# Patient Record
Sex: FEMALE | Race: WHITE | URBAN - METROPOLITAN AREA
[De-identification: names, ages, dates, MRNs, and addresses within clinical notes are randomized per-mention and may not be internally consistent; named-entity substitution may affect disease eponyms.]

---

## 2017-01-18 RX ORDER — ESCITALOPRAM OXALATE 20 MG/1
TABLET ORAL
Qty: 30 TABLET | Refills: 0 | Status: SHIPPED | OUTPATIENT
Start: 2017-01-18 | End: 2017-01-27 | Stop reason: SDUPTHER

## 2017-01-27 ENCOUNTER — OFFICE VISIT (OUTPATIENT)
Dept: FAMILY MEDICINE CLINIC | Age: 42
End: 2017-01-27

## 2017-01-27 VITALS
RESPIRATION RATE: 12 BRPM | HEART RATE: 68 BPM | DIASTOLIC BLOOD PRESSURE: 70 MMHG | BODY MASS INDEX: 35 KG/M2 | HEIGHT: 67 IN | SYSTOLIC BLOOD PRESSURE: 120 MMHG | WEIGHT: 223 LBS

## 2017-01-27 DIAGNOSIS — F33.1 MODERATE EPISODE OF RECURRENT MAJOR DEPRESSIVE DISORDER (HCC): ICD-10-CM

## 2017-01-27 DIAGNOSIS — F41.9 ANXIETY: Primary | ICD-10-CM

## 2017-01-27 PROCEDURE — G0444 DEPRESSION SCREEN ANNUAL: HCPCS | Performed by: FAMILY MEDICINE

## 2017-01-27 PROCEDURE — 99213 OFFICE O/P EST LOW 20 MIN: CPT | Performed by: FAMILY MEDICINE

## 2017-01-27 RX ORDER — ESCITALOPRAM OXALATE 20 MG/1
TABLET ORAL
Qty: 90 TABLET | Refills: 1 | Status: SHIPPED | OUTPATIENT
Start: 2017-01-27 | End: 2017-08-06 | Stop reason: SDUPTHER

## 2017-01-27 ASSESSMENT — PATIENT HEALTH QUESTIONNAIRE - PHQ9
7. TROUBLE CONCENTRATING ON THINGS, SUCH AS READING THE NEWSPAPER OR WATCHING TELEVISION: 2
10. IF YOU CHECKED OFF ANY PROBLEMS, HOW DIFFICULT HAVE THESE PROBLEMS MADE IT FOR YOU TO DO YOUR WORK, TAKE CARE OF THINGS AT HOME, OR GET ALONG WITH OTHER PEOPLE: 1
9. THOUGHTS THAT YOU WOULD BE BETTER OFF DEAD, OR OF HURTING YOURSELF: 0
SUM OF ALL RESPONSES TO PHQ QUESTIONS 1-9: 17
2. FEELING DOWN, DEPRESSED OR HOPELESS: 3
5. POOR APPETITE OR OVEREATING: 3
6. FEELING BAD ABOUT YOURSELF - OR THAT YOU ARE A FAILURE OR HAVE LET YOURSELF OR YOUR FAMILY DOWN: 3
8. MOVING OR SPEAKING SO SLOWLY THAT OTHER PEOPLE COULD HAVE NOTICED. OR THE OPPOSITE, BEING SO FIGETY OR RESTLESS THAT YOU HAVE BEEN MOVING AROUND A LOT MORE THAN USUAL: 1
3. TROUBLE FALLING OR STAYING ASLEEP: 3
SUM OF ALL RESPONSES TO PHQ9 QUESTIONS 1 & 2: 3
4. FEELING TIRED OR HAVING LITTLE ENERGY: 2

## 2017-02-15 ENCOUNTER — OFFICE VISIT (OUTPATIENT)
Dept: PSYCHOLOGY | Age: 42
End: 2017-02-15

## 2017-02-15 DIAGNOSIS — F32.89 OTHER DEPRESSION: Primary | ICD-10-CM

## 2017-02-15 PROCEDURE — 90791 PSYCH DIAGNOSTIC EVALUATION: CPT | Performed by: PSYCHOLOGIST

## 2017-02-27 RX ORDER — LEVOTHYROXINE SODIUM 112 UG/1
112 TABLET ORAL DAILY
Qty: 90 TABLET | Refills: 1 | Status: SHIPPED | OUTPATIENT
Start: 2017-02-27 | End: 2017-06-27 | Stop reason: SDUPTHER

## 2017-06-27 RX ORDER — LEVOTHYROXINE SODIUM 112 UG/1
112 TABLET ORAL DAILY
Qty: 90 TABLET | Refills: 0 | Status: SHIPPED | OUTPATIENT
Start: 2017-06-27 | End: 2017-08-20 | Stop reason: SDUPTHER

## 2017-08-09 RX ORDER — ESCITALOPRAM OXALATE 20 MG/1
TABLET ORAL
Qty: 30 TABLET | Refills: 0 | Status: SHIPPED | OUTPATIENT
Start: 2017-08-09 | End: 2017-10-05 | Stop reason: SDUPTHER

## 2017-08-21 RX ORDER — LEVOTHYROXINE SODIUM 112 UG/1
TABLET ORAL
Qty: 30 TABLET | Refills: 0 | Status: SHIPPED | OUTPATIENT
Start: 2017-08-21 | End: 2017-09-20 | Stop reason: SDUPTHER

## 2017-09-20 RX ORDER — LEVOTHYROXINE SODIUM 112 UG/1
112 TABLET ORAL DAILY
Qty: 30 TABLET | Refills: 0 | Status: SHIPPED | OUTPATIENT
Start: 2017-09-20 | End: 2017-09-25 | Stop reason: SDUPTHER

## 2017-09-24 RX ORDER — LEVOTHYROXINE SODIUM 112 UG/1
112 TABLET ORAL DAILY
Qty: 30 TABLET | Refills: 0 | Status: SHIPPED | OUTPATIENT
Start: 2017-09-24 | End: 2017-09-26 | Stop reason: DRUGHIGH

## 2017-09-25 ENCOUNTER — OFFICE VISIT (OUTPATIENT)
Dept: FAMILY MEDICINE CLINIC | Age: 42
End: 2017-09-25

## 2017-09-25 VITALS
HEART RATE: 84 BPM | RESPIRATION RATE: 16 BRPM | OXYGEN SATURATION: 96 % | SYSTOLIC BLOOD PRESSURE: 98 MMHG | HEIGHT: 67 IN | DIASTOLIC BLOOD PRESSURE: 70 MMHG | BODY MASS INDEX: 37.35 KG/M2 | WEIGHT: 238 LBS

## 2017-09-25 DIAGNOSIS — Z13.220 SCREENING FOR HYPERLIPIDEMIA: ICD-10-CM

## 2017-09-25 DIAGNOSIS — Z13.1 SCREENING FOR DIABETES MELLITUS: ICD-10-CM

## 2017-09-25 DIAGNOSIS — Z23 NEED FOR INFLUENZA VACCINATION: ICD-10-CM

## 2017-09-25 DIAGNOSIS — F41.9 ANXIETY: Primary | ICD-10-CM

## 2017-09-25 DIAGNOSIS — E03.9 ACQUIRED HYPOTHYROIDISM: ICD-10-CM

## 2017-09-25 DIAGNOSIS — E66.01 SEVERE OBESITY (BMI 35.0-35.9 WITH COMORBIDITY) (HCC): ICD-10-CM

## 2017-09-25 LAB
T4 FREE: 1 NG/DL (ref 0.9–1.8)
TSH REFLEX: 17 UIU/ML (ref 0.27–4.2)

## 2017-09-25 PROCEDURE — 99214 OFFICE O/P EST MOD 30 MIN: CPT | Performed by: FAMILY MEDICINE

## 2017-09-25 PROCEDURE — 90688 IIV4 VACCINE SPLT 0.5 ML IM: CPT | Performed by: FAMILY MEDICINE

## 2017-09-25 PROCEDURE — 36415 COLL VENOUS BLD VENIPUNCTURE: CPT | Performed by: FAMILY MEDICINE

## 2017-09-25 PROCEDURE — 90471 IMMUNIZATION ADMIN: CPT | Performed by: FAMILY MEDICINE

## 2017-09-26 ENCOUNTER — TELEPHONE (OUTPATIENT)
Dept: FAMILY MEDICINE CLINIC | Age: 42
End: 2017-09-26

## 2017-09-26 RX ORDER — LEVOTHYROXINE SODIUM 0.12 MG/1
125 TABLET ORAL DAILY
Qty: 90 TABLET | Refills: 0 | Status: SHIPPED | OUTPATIENT
Start: 2017-09-26 | End: 2017-10-06 | Stop reason: SDUPTHER

## 2017-10-02 ENCOUNTER — TELEPHONE (OUTPATIENT)
Dept: FAMILY MEDICINE CLINIC | Age: 42
End: 2017-10-02

## 2017-10-02 NOTE — TELEPHONE ENCOUNTER
Left message for patient to call back. Calling patient with recent lab results. I accidentally closed the encounter by Kathya Dominguez.

## 2017-10-04 RX ORDER — LEVOTHYROXINE SODIUM 112 UG/1
TABLET ORAL
Qty: 30 TABLET | OUTPATIENT
Start: 2017-10-04

## 2017-10-05 RX ORDER — ESCITALOPRAM OXALATE 20 MG/1
TABLET ORAL
Qty: 90 TABLET | Refills: 0 | Status: SHIPPED | OUTPATIENT
Start: 2017-10-05 | End: 2017-11-29 | Stop reason: SDUPTHER

## 2017-10-05 NOTE — TELEPHONE ENCOUNTER
Spoke with patient, she did received message about her abnormal TSH level and that  update her dosage to 125 mcg. Pt will mychart us later about the appointment.

## 2017-10-06 RX ORDER — ESCITALOPRAM OXALATE 20 MG/1
TABLET ORAL
Qty: 30 TABLET | Refills: 0 | OUTPATIENT
Start: 2017-10-06

## 2017-10-06 RX ORDER — LEVOTHYROXINE SODIUM 0.12 MG/1
125 TABLET ORAL DAILY
Qty: 90 TABLET | Refills: 0 | Status: SHIPPED | OUTPATIENT
Start: 2017-10-06 | End: 2017-11-02 | Stop reason: SDUPTHER

## 2017-10-30 RX ORDER — LEVOTHYROXINE SODIUM 0.12 MG/1
125 TABLET ORAL DAILY
Qty: 30 TABLET | Refills: 0 | OUTPATIENT
Start: 2017-10-30

## 2017-10-30 NOTE — TELEPHONE ENCOUNTER
From: Enma Coleman  Sent: 10/29/2017 9:32 AM EDT  Subject: Medication Renewal Request    Enma Mcallisterl would like a refill of the following medications:  levothyroxine (SYNTHROID) 125 MCG tablet Joon Molina MD]    Preferred pharmacy: [address], [address] [facility]  [phone] - F [phone]    Comment:  Can a 30 day prescription be called into [facility]? I'm aware that I need a fasting blood draw and I'll come in sometime this week but I have four pills left. Thanks!

## 2017-11-01 ENCOUNTER — NURSE ONLY (OUTPATIENT)
Dept: FAMILY MEDICINE CLINIC | Age: 42
End: 2017-11-01

## 2017-11-01 DIAGNOSIS — Z13.220 SCREENING FOR HYPERLIPIDEMIA: ICD-10-CM

## 2017-11-01 LAB
CHOLESTEROL, TOTAL: 208 MG/DL (ref 0–199)
HDLC SERPL-MCNC: 39 MG/DL (ref 40–60)
LDL CHOLESTEROL CALCULATED: 127 MG/DL
TRIGL SERPL-MCNC: 211 MG/DL (ref 0–150)
VLDLC SERPL CALC-MCNC: 42 MG/DL

## 2017-11-01 PROCEDURE — 36415 COLL VENOUS BLD VENIPUNCTURE: CPT | Performed by: FAMILY MEDICINE

## 2017-11-02 RX ORDER — LEVOTHYROXINE SODIUM 0.12 MG/1
125 TABLET ORAL DAILY
Qty: 90 TABLET | Refills: 0 | Status: SHIPPED | OUTPATIENT
Start: 2017-11-02 | End: 2017-12-27 | Stop reason: SDUPTHER

## 2017-11-14 ENCOUNTER — PATIENT MESSAGE (OUTPATIENT)
Dept: FAMILY MEDICINE CLINIC | Age: 42
End: 2017-11-14

## 2017-11-14 NOTE — TELEPHONE ENCOUNTER
From: Bibi Weathers  To: Kelli Samuels MD  Sent: 11/14/2017 4:47 PM EST  Subject: Visit Follow-Up Question    Hi,    I have messaged Dr. Saeid Bacon in an effort to make an appointment with her, I'm not sure of her office hours? I'm very depressed and this has been a downhill slide since getting half of my thyroid taken out in 2014. I don't think that is the cause but it was a starting point of the depression. I know the Lexapro has really served me well with my anxiety, I'm so much better. What about anti-depressants? I can't seem to pick myself back up and I know I have half of my life to live only if I get my weight under control but it appears I don't have it in me. Just wondering if an anti-depressant would help get me started back on the right track- help me see light again. Thanks.

## 2017-11-20 ENCOUNTER — OFFICE VISIT (OUTPATIENT)
Dept: FAMILY MEDICINE CLINIC | Age: 42
End: 2017-11-20

## 2017-11-20 VITALS
RESPIRATION RATE: 16 BRPM | TEMPERATURE: 98 F | OXYGEN SATURATION: 97 % | WEIGHT: 244 LBS | BODY MASS INDEX: 38.3 KG/M2 | DIASTOLIC BLOOD PRESSURE: 76 MMHG | HEIGHT: 67 IN | HEART RATE: 95 BPM | SYSTOLIC BLOOD PRESSURE: 118 MMHG

## 2017-11-20 DIAGNOSIS — J45.21 RAD (REACTIVE AIRWAY DISEASE) WITH WHEEZING, MILD INTERMITTENT, WITH ACUTE EXACERBATION: Primary | ICD-10-CM

## 2017-11-20 DIAGNOSIS — F32.1 MODERATE SINGLE CURRENT EPISODE OF MAJOR DEPRESSIVE DISORDER (HCC): ICD-10-CM

## 2017-11-20 PROCEDURE — G8484 FLU IMMUNIZE NO ADMIN: HCPCS | Performed by: FAMILY MEDICINE

## 2017-11-20 PROCEDURE — G8417 CALC BMI ABV UP PARAM F/U: HCPCS | Performed by: FAMILY MEDICINE

## 2017-11-20 PROCEDURE — 99214 OFFICE O/P EST MOD 30 MIN: CPT | Performed by: FAMILY MEDICINE

## 2017-11-20 PROCEDURE — 1036F TOBACCO NON-USER: CPT | Performed by: FAMILY MEDICINE

## 2017-11-20 PROCEDURE — G8427 DOCREV CUR MEDS BY ELIG CLIN: HCPCS | Performed by: FAMILY MEDICINE

## 2017-11-20 RX ORDER — AZITHROMYCIN 250 MG/1
TABLET, FILM COATED ORAL
Qty: 1 PACKET | Refills: 0 | Status: SHIPPED | OUTPATIENT
Start: 2017-11-20 | End: 2017-11-30

## 2017-11-20 RX ORDER — PREDNISONE 10 MG/1
10 TABLET ORAL DAILY
Qty: 30 TABLET | Refills: 0 | Status: SHIPPED | OUTPATIENT
Start: 2017-11-20 | End: 2017-11-20 | Stop reason: SDUPTHER

## 2017-11-20 RX ORDER — ALBUTEROL SULFATE 90 UG/1
2 AEROSOL, METERED RESPIRATORY (INHALATION) EVERY 6 HOURS PRN
Qty: 1 INHALER | Refills: 3 | Status: SHIPPED | OUTPATIENT
Start: 2017-11-20

## 2017-11-20 RX ORDER — BUPROPION HYDROCHLORIDE 150 MG/1
150 TABLET ORAL EVERY MORNING
Qty: 30 TABLET | Refills: 0 | Status: SHIPPED | OUTPATIENT
Start: 2017-11-20 | End: 2017-12-18 | Stop reason: SDUPTHER

## 2017-11-20 RX ORDER — PREDNISONE 10 MG/1
TABLET ORAL
Qty: 30 TABLET | Refills: 0 | Status: SHIPPED | OUTPATIENT
Start: 2017-11-20 | End: 2017-12-18 | Stop reason: ALTCHOICE

## 2017-11-20 NOTE — PROGRESS NOTES
Subjective:      Patient ID: Bibi Weathers is a 43 y.o. female. HPI    Acute illness  Pt presenting with 4 days of chest tightness, mild SOB, dry cough, rhinitis and nasal congestion    No fever, sinus pressure/pain. Nl appetite. Mood Disorder:  Patient presents for follow-up of depression and anxiety disorder. Current complaints include: 1-2 months of worsening depresssed mood, lack of motivation, and anhedonia. She denies tearfulness, feelings of hopelessness, feelings of worthlessness/excessive guilt, insomnia, excessive worry, panic attacks, increased use of drugs or alcohol and suicidal thoughts or behavior. Symptoms/signs of tia: none. External stressors: family issues. Current treatment includes: Lexapro- 20mg. Medication side effects: none. Vitals:    11/20/17 1517   BP: 118/76   Pulse: 95   Resp: 16   Temp: 98 °F (36.7 °C)   TempSrc: Oral   SpO2: 97%   Weight: 244 lb (110.7 kg)   Height: 5' 7\" (1.702 m)       Outpatient Prescriptions Marked as Taking for the 11/20/17 encounter (Office Visit) with Kelli Samuels MD   Medication Sig Dispense Refill    levothyroxine (SYNTHROID) 125 MCG tablet Take 1 tablet by mouth Daily 90 tablet 0    escitalopram (LEXAPRO) 20 MG tablet Take 1 tablet by mouth  daily 90 tablet 0    Multiple Vitamins-Minerals (THERAPEUTIC MULTIVITAMIN-MINERALS) tablet Take 1 tablet by mouth daily.          Past Medical History:   Diagnosis Date    Anxiety     Hashimoto's disease     History of tobacco abuse     OCD (obsessive compulsive disorder)     Thyroid adenoma     path on left thyroidectomy came back hashimotos    Thyromegaly     Wears glasses     for computer use only       Past Surgical History:   Procedure Laterality Date    BREAST REDUCTION SURGERY  2000    THYROID LOBECTOMY Left        Social History   Substance Use Topics    Smoking status: Former Smoker     Packs/day: 2.00     Types: Cigarettes     Start date: 6/1/1993     Quit date: 6/23/2000    Smokeless tobacco: Never Used      Comment: quit 2000    Alcohol use Yes      Comment: one glass of wine a day       Family History   Problem Relation Age of Onset    Depression Mother     Cancer Mother 54    Alcohol Abuse Father     Cancer Father      bladder cancer    Diabetes Paternal Grandmother     Heart Disease Paternal Grandfather              Review of Systems    Objective:   Physical Exam  Constitutional:   · Reviewed vitals above  · Well Nourished, well developed, no distress       HENT:  · Normal external nose without lesions  · Bilateral TMs translucent with normal light reflex and bony landmarks  · Normal oropharynx without erythema or exudate  · +nasal congestion  · No sinus pain with palpation  Neck:  · Symmetric and without masses  · No thyromegaly  Resp:  · Normal effort  · +intermittent diffuse mild expiratory wheezing  · No crackles or rhonchi  Cardiovascular:  · On auscultation, normal S1 and S2 without murmurs, rubs or gallops  · No bruits of bilateral carotids and no JVD  Gastrointestinal:  · Nontender, nondistended, and no masses  · No hepatosplenomegaly  Musculoskeletal:  · All extremities without clubbing, cyanosis or edema  Skin:  · No rashes on inspection  Psych:  · Depressed mood with flat affect  · Normal insight and judgement    Assessment:      See below      Plan:      1. RAD (reactive airway disease) with wheezing, mild intermittent, with acute exacerbation  Treating with the following:  - albuterol sulfate HFA (PROVENTIL HFA) 108 (90 Base) MCG/ACT inhaler; Inhale 2 puffs into the lungs every 6 hours as needed for Wheezing  Dispense: 1 Inhaler; Refill: 3  - azithromycin (ZITHROMAX) 250 MG tablet; Take 2 tabs (500 mg) on Day 1, and take 1 tab (250 mg) on days 2 through 5. Dispense: 1 packet;  Refill: 0  - predniSONE (DELTASONE) 10 MG tablet; 4 tab po qday x 3 days, then 3 tabs po qday x 3 days, then 2 tabs po qday x 3 days, then 1 tab po qday x 3 days  Dispense: 30 tablet; Refill: 0    Pt told to Call or return to clinic prn if these symptoms worsen or fail to improve as anticipated. Pt aware that she should call 911 if ever develops severe SOB. 2. Moderate single current episode of major depressive disorder (HCC)  Uncontrolled. Adding wellbutrin 150mg XL to regimen. Discussed side effects and expected course. Pt reported understanding. Patient aware it could worsen her anxiety and we'll need to monitor her closely. Continue lexapro 20mg    Pt contracts to safety    Pt to schedule f/u with myself in 2 weeks, and schedule counseling visit with dr Renetta Cordova  - buPROPion (WELLBUTRIN XL) 150 MG extended release tablet; Take 1 tablet by mouth every morning  Dispense: 30 tablet;  Refill: 0

## 2017-11-21 ENCOUNTER — PATIENT MESSAGE (OUTPATIENT)
Dept: FAMILY MEDICINE CLINIC | Age: 42
End: 2017-11-21

## 2017-11-21 RX ORDER — FLUCONAZOLE 100 MG/1
100 TABLET ORAL DAILY
Refills: 0 | Status: CANCELLED | OUTPATIENT
Start: 2017-11-21 | End: 2017-11-28

## 2017-11-22 ENCOUNTER — PATIENT MESSAGE (OUTPATIENT)
Dept: FAMILY MEDICINE CLINIC | Age: 42
End: 2017-11-22

## 2017-11-22 RX ORDER — FLUCONAZOLE 150 MG/1
150 TABLET ORAL ONCE
Qty: 1 TABLET | Refills: 0 | Status: SHIPPED | OUTPATIENT
Start: 2017-11-22 | End: 2017-11-22

## 2017-11-30 RX ORDER — ESCITALOPRAM OXALATE 20 MG/1
TABLET ORAL
Qty: 90 TABLET | Refills: 0 | Status: SHIPPED | OUTPATIENT
Start: 2017-11-30 | End: 2018-01-17 | Stop reason: SDUPTHER

## 2017-12-18 ENCOUNTER — OFFICE VISIT (OUTPATIENT)
Dept: FAMILY MEDICINE CLINIC | Age: 42
End: 2017-12-18

## 2017-12-18 VITALS
RESPIRATION RATE: 12 BRPM | HEIGHT: 67 IN | HEART RATE: 96 BPM | OXYGEN SATURATION: 97 % | WEIGHT: 241 LBS | SYSTOLIC BLOOD PRESSURE: 114 MMHG | DIASTOLIC BLOOD PRESSURE: 80 MMHG | BODY MASS INDEX: 37.83 KG/M2

## 2017-12-18 DIAGNOSIS — F32.1 MODERATE SINGLE CURRENT EPISODE OF MAJOR DEPRESSIVE DISORDER (HCC): ICD-10-CM

## 2017-12-18 DIAGNOSIS — Z13.1 SCREENING FOR DIABETES MELLITUS: ICD-10-CM

## 2017-12-18 LAB — GLUCOSE BLD-MCNC: 98 MG/DL (ref 70–99)

## 2017-12-18 PROCEDURE — 99212 OFFICE O/P EST SF 10 MIN: CPT | Performed by: FAMILY MEDICINE

## 2017-12-18 PROCEDURE — 36415 COLL VENOUS BLD VENIPUNCTURE: CPT | Performed by: FAMILY MEDICINE

## 2017-12-18 RX ORDER — BUPROPION HYDROCHLORIDE 150 MG/1
150 TABLET ORAL EVERY MORNING
Qty: 30 TABLET | Refills: 3 | Status: SHIPPED | OUTPATIENT
Start: 2017-12-18 | End: 2018-02-23 | Stop reason: ALTCHOICE

## 2017-12-18 NOTE — PROGRESS NOTES
tablet Take 1 tablet by mouth daily. Past Medical History:   Diagnosis Date    Anxiety     Hashimoto's disease     History of tobacco abuse     OCD (obsessive compulsive disorder)     Thyroid adenoma     path on left thyroidectomy came back hashimotos    Thyromegaly     Wears glasses     for computer use only       Past Surgical History:   Procedure Laterality Date    BREAST REDUCTION SURGERY  2000    THYROID LOBECTOMY Left        Social History   Substance Use Topics    Smoking status: Former Smoker     Packs/day: 2.00     Types: Cigarettes     Start date: 6/1/1993     Quit date: 6/23/2000    Smokeless tobacco: Never Used      Comment: quit 2000    Alcohol use Yes      Comment: one glass of wine a day       Family History   Problem Relation Age of Onset    Depression Mother     Cancer Mother 54    Alcohol Abuse Father     Cancer Father      bladder cancer    Diabetes Paternal Grandmother     Heart Disease Paternal Grandfather              Review of Systems    Objective:   Physical Exam  Constitutional:   · Reviewed vitals above  · Well Nourished, well developed, no distress       ·   Psych:  · Normal mood and affect  · Normal insight and judgement    Assessment:      See below      Plan: Moderate single current episode of major depressive disorder (HCC)  Improving. Counseled patient that I still anticipate that over the next month she will continue to improve. I would not recommend increasing the Wellbutrin at this time since the 150 mg dose is causing some mild increase in anxiety. Continue lexapro 20mg and Wellbutrin 150 mg. Pt contracts to safety    Pt to schedule f/u with myself in 1-2 months,and schedule counseling visit with dr Amalia Looney      Pt to f/u sooner if sx's acutely worsen.        10 minutes were spent on this visit, >50% spent with counseling

## 2017-12-27 RX ORDER — LEVOTHYROXINE SODIUM 0.12 MG/1
125 TABLET ORAL DAILY
Qty: 90 TABLET | Refills: 0 | Status: SHIPPED | OUTPATIENT
Start: 2017-12-27 | End: 2018-01-15 | Stop reason: SDUPTHER

## 2018-01-15 DIAGNOSIS — F41.9 ANXIETY: ICD-10-CM

## 2018-01-15 DIAGNOSIS — E06.3 HASHIMOTO'S DISEASE: Primary | ICD-10-CM

## 2018-01-15 NOTE — TELEPHONE ENCOUNTER
1) delete optum RX from pt's pharmacies    2) inform pt she is overdue for thyroid blood work after last dosage change.       Once nurse visit is scheduled, pend TSH with reflex, change thyroid med to one month worth, and route back

## 2018-01-15 NOTE — TELEPHONE ENCOUNTER
From: Oniel Lin  Sent: 1/14/2018 12:47 PM EST  Subject: Medication Renewal Request    Oniel Lin would like a refill of the following medications:  levothyroxine (SYNTHROID) 125 MCG tablet Edwin Rosario MD]    Preferred pharmacy: [address], [address] [place] [address]  [phone] - F [phone]    Comment:  Please send to [facility] in [facility]. .. 30 day, they won't fill 90 day. Thank you!     Medication renewals requested in this message routed separately:  escitalopram (LEXAPRO) 20 MG tablet Miriam Cardozo MD]

## 2018-01-15 NOTE — TELEPHONE ENCOUNTER
From: Morteza Godfrey  Sent: 1/14/2018 12:47 PM EST  Subject: Medication Renewal Request    Morteza Godfrey would like a refill of the following medications:  escitalopram (LEXAPRO) 20 MG tablet Kalpana George MD]    Preferred pharmacy: [address], [address] [place] [address]  [phone] - F [phone]    Comment:  Please send to [facility] in [place]. .. 30 day, they won't fill 90 day. Thank you!     Medication renewals requested in this message routed separately:  levothyroxine (SYNTHROID) 125 MCG tablet Ivan Nolasco MD]

## 2018-01-16 ENCOUNTER — PATIENT MESSAGE (OUTPATIENT)
Dept: FAMILY MEDICINE CLINIC | Age: 43
End: 2018-01-16

## 2018-01-16 RX ORDER — ESCITALOPRAM OXALATE 20 MG/1
20 TABLET ORAL DAILY
Qty: 30 TABLET | Refills: 1 | Status: CANCELLED | OUTPATIENT
Start: 2018-01-16

## 2018-01-17 RX ORDER — LEVOTHYROXINE SODIUM 0.12 MG/1
125 TABLET ORAL DAILY
Qty: 30 TABLET | Refills: 0 | Status: SHIPPED | OUTPATIENT
Start: 2018-01-17 | End: 2018-01-24 | Stop reason: DRUGHIGH

## 2018-01-17 RX ORDER — ESCITALOPRAM OXALATE 20 MG/1
TABLET ORAL
Qty: 90 TABLET | Refills: 0 | Status: SHIPPED | OUTPATIENT
Start: 2018-01-17 | End: 2018-02-23 | Stop reason: ALTCHOICE

## 2018-01-17 NOTE — TELEPHONE ENCOUNTER
From: Cachorro Polanco  Sent: 1/16/2018 5:29 PM EST  To: [facility]  Practice Staff  Subject: RE:(No subject)    I will come in for a blood draw but I only have one Lexapro left. Due to my work schedule it would have to after four. Please, please call in my prescription to [facility], I was not aware this would be a problem. By the way, my phone number is [phone]. I did not get your message until after hours. ----- Message -----  From: Howard Butterfield  Sent: 1/16/2018 8:48 AM EST  To: Cachorro Polanco  Subject: (No subject)  Juan Seymour are due for blood draw to check your thyroid level. Please call us to schedule a nurse visit just for blood draw. Then  can refill your medications.     Thank you,  Michael Ludwig

## 2018-01-22 ENCOUNTER — NURSE ONLY (OUTPATIENT)
Dept: FAMILY MEDICINE CLINIC | Age: 43
End: 2018-01-22

## 2018-01-22 DIAGNOSIS — E06.3 HASHIMOTO'S DISEASE: ICD-10-CM

## 2018-01-22 LAB
T4 FREE: 1 NG/DL (ref 0.9–1.8)
TSH REFLEX: 6.28 UIU/ML (ref 0.27–4.2)

## 2018-01-22 PROCEDURE — 36415 COLL VENOUS BLD VENIPUNCTURE: CPT | Performed by: FAMILY MEDICINE

## 2018-01-23 ENCOUNTER — PATIENT MESSAGE (OUTPATIENT)
Dept: FAMILY MEDICINE CLINIC | Age: 43
End: 2018-01-23

## 2018-01-24 ENCOUNTER — TELEPHONE (OUTPATIENT)
Dept: FAMILY MEDICINE CLINIC | Age: 43
End: 2018-01-24

## 2018-01-24 DIAGNOSIS — E03.9 ACQUIRED HYPOTHYROIDISM: Primary | ICD-10-CM

## 2018-01-24 RX ORDER — LEVOTHYROXINE SODIUM 0.15 MG/1
150 TABLET ORAL DAILY
Qty: 30 TABLET | Refills: 1 | Status: SHIPPED | OUTPATIENT
Start: 2018-01-24 | End: 2018-02-23 | Stop reason: SDUPTHER

## 2018-02-01 ENCOUNTER — TELEPHONE (OUTPATIENT)
Dept: FAMILY MEDICINE CLINIC | Age: 43
End: 2018-02-01

## 2018-02-01 NOTE — TELEPHONE ENCOUNTER
I would have filled it for if she had follow-up appointments are scheduled for March.  As she has not been seen in 5 months this needs to go through Dr. Sintia Tesfaye tomorrow

## 2018-02-23 ENCOUNTER — OFFICE VISIT (OUTPATIENT)
Dept: FAMILY MEDICINE CLINIC | Age: 43
End: 2018-02-23

## 2018-02-23 VITALS
RESPIRATION RATE: 12 BRPM | BODY MASS INDEX: 38.77 KG/M2 | WEIGHT: 247 LBS | HEIGHT: 67 IN | OXYGEN SATURATION: 96 % | SYSTOLIC BLOOD PRESSURE: 108 MMHG | HEART RATE: 96 BPM | DIASTOLIC BLOOD PRESSURE: 68 MMHG

## 2018-02-23 DIAGNOSIS — Z23 NEED FOR TDAP VACCINATION: ICD-10-CM

## 2018-02-23 DIAGNOSIS — E03.9 ACQUIRED HYPOTHYROIDISM: Primary | ICD-10-CM

## 2018-02-23 PROCEDURE — 90715 TDAP VACCINE 7 YRS/> IM: CPT | Performed by: FAMILY MEDICINE

## 2018-02-23 PROCEDURE — 90471 IMMUNIZATION ADMIN: CPT | Performed by: FAMILY MEDICINE

## 2018-02-23 RX ORDER — LEVOTHYROXINE SODIUM 0.15 MG/1
150 TABLET ORAL DAILY
Qty: 30 TABLET | Refills: 1 | Status: SHIPPED | OUTPATIENT
Start: 2018-02-23 | End: 2018-03-15 | Stop reason: SDUPTHER

## 2018-03-14 ENCOUNTER — OFFICE VISIT (OUTPATIENT)
Dept: FAMILY MEDICINE CLINIC | Age: 43
End: 2018-03-14

## 2018-03-14 VITALS
BODY MASS INDEX: 38.77 KG/M2 | WEIGHT: 247 LBS | DIASTOLIC BLOOD PRESSURE: 84 MMHG | RESPIRATION RATE: 16 BRPM | SYSTOLIC BLOOD PRESSURE: 125 MMHG | HEIGHT: 67 IN | HEART RATE: 82 BPM

## 2018-03-14 DIAGNOSIS — F33.1 MODERATE EPISODE OF RECURRENT MAJOR DEPRESSIVE DISORDER (HCC): ICD-10-CM

## 2018-03-14 DIAGNOSIS — E03.9 ACQUIRED HYPOTHYROIDISM: ICD-10-CM

## 2018-03-14 DIAGNOSIS — F41.9 ANXIETY: Primary | ICD-10-CM

## 2018-03-14 DIAGNOSIS — F32.1 MODERATE SINGLE CURRENT EPISODE OF MAJOR DEPRESSIVE DISORDER (HCC): ICD-10-CM

## 2018-03-14 PROCEDURE — 99213 OFFICE O/P EST LOW 20 MIN: CPT | Performed by: FAMILY MEDICINE

## 2018-03-14 PROCEDURE — G0444 DEPRESSION SCREEN ANNUAL: HCPCS | Performed by: FAMILY MEDICINE

## 2018-03-14 RX ORDER — BUPROPION HYDROCHLORIDE 150 MG/1
150 TABLET ORAL EVERY MORNING
Qty: 90 TABLET | Refills: 0 | Status: SHIPPED | OUTPATIENT
Start: 2018-03-14 | End: 2018-07-16 | Stop reason: SDUPTHER

## 2018-03-14 RX ORDER — ESCITALOPRAM OXALATE 10 MG/1
10 TABLET ORAL DAILY
Qty: 90 TABLET | Refills: 0 | Status: SHIPPED | OUTPATIENT
Start: 2018-03-14 | End: 2018-07-16 | Stop reason: SDUPTHER

## 2018-03-14 ASSESSMENT — PATIENT HEALTH QUESTIONNAIRE - PHQ9
7. TROUBLE CONCENTRATING ON THINGS, SUCH AS READING THE NEWSPAPER OR WATCHING TELEVISION: 0
SUM OF ALL RESPONSES TO PHQ9 QUESTIONS 1 & 2: 2
SUM OF ALL RESPONSES TO PHQ QUESTIONS 1-9: 12
9. THOUGHTS THAT YOU WOULD BE BETTER OFF DEAD, OR OF HURTING YOURSELF: 1
10. IF YOU CHECKED OFF ANY PROBLEMS, HOW DIFFICULT HAVE THESE PROBLEMS MADE IT FOR YOU TO DO YOUR WORK, TAKE CARE OF THINGS AT HOME, OR GET ALONG WITH OTHER PEOPLE: 1
5. POOR APPETITE OR OVEREATING: 2
6. FEELING BAD ABOUT YOURSELF - OR THAT YOU ARE A FAILURE OR HAVE LET YOURSELF OR YOUR FAMILY DOWN: 2
3. TROUBLE FALLING OR STAYING ASLEEP: 2
2. FEELING DOWN, DEPRESSED OR HOPELESS: 2
1. LITTLE INTEREST OR PLEASURE IN DOING THINGS: 0
4. FEELING TIRED OR HAVING LITTLE ENERGY: 2
8. MOVING OR SPEAKING SO SLOWLY THAT OTHER PEOPLE COULD HAVE NOTICED. OR THE OPPOSITE, BEING SO FIGETY OR RESTLESS THAT YOU HAVE BEEN MOVING AROUND A LOT MORE THAN USUAL: 1

## 2018-03-14 NOTE — PROGRESS NOTES
date: 6/1/1993     Quit date: 6/23/2000    Smokeless tobacco: Never Used      Comment: quit 2000    Alcohol use Yes      Comment: one glass of wine a day       Family History   Problem Relation Age of Onset    Depression Mother     Cancer Mother 54    Alcohol Abuse Father     Cancer Father      bladder cancer    Diabetes Paternal Grandmother     Heart Disease Paternal Grandfather              Review of Systems  See hpi    Objective:   Physical Exam  Constitutional:   · Reviewed vitals above  · Well Nourished, well developed, no distress       Psych:  · Normal mood and affect  · Normal insight and judgement    Assessment:      See below      Plan:      1. Anxiety/ Moderate episode of recurrent major depressive disorder ([facility] [address]  Not controlled, but very mild improvement after being on medicine for 1 week. Discussed that it can take 4-6 weeks to start working fully. Patient is aware. We'll not adjust medicine at this time. Continue Lexapro 10 mg and Wellbutrin 150 mg. Patient told to let me know if symptoms acutely worsen. She does contract to safety.     She will be losing her insurance at the end of the month, but does have [employer]'s financial information to see if she qualifies for a partnership program.     15 minutes were spent on this visit, >50% spent with counseling

## 2018-03-15 DIAGNOSIS — E03.9 ACQUIRED HYPOTHYROIDISM: ICD-10-CM

## 2018-03-15 LAB — TSH REFLEX: 3.85 UIU/ML (ref 0.27–4.2)

## 2018-03-15 RX ORDER — LEVOTHYROXINE SODIUM 0.15 MG/1
150 TABLET ORAL DAILY
Qty: 90 TABLET | Refills: 1 | Status: SHIPPED | OUTPATIENT
Start: 2018-03-15 | End: 2018-03-26 | Stop reason: SDUPTHER

## 2018-03-24 ENCOUNTER — PATIENT MESSAGE (OUTPATIENT)
Dept: FAMILY MEDICINE CLINIC | Age: 43
End: 2018-03-24

## 2018-03-24 DIAGNOSIS — E03.9 ACQUIRED HYPOTHYROIDISM: ICD-10-CM

## 2018-03-26 DIAGNOSIS — E03.9 ACQUIRED HYPOTHYROIDISM: ICD-10-CM

## 2018-03-26 RX ORDER — LEVOTHYROXINE SODIUM 0.15 MG/1
150 TABLET ORAL DAILY
Qty: 90 TABLET | Refills: 1 | OUTPATIENT
Start: 2018-03-26

## 2018-03-27 RX ORDER — LEVOTHYROXINE SODIUM 0.15 MG/1
150 TABLET ORAL DAILY
Qty: 90 TABLET | Refills: 1 | Status: SHIPPED | OUTPATIENT
Start: 2018-03-27

## 2018-07-16 DIAGNOSIS — F32.1 MODERATE SINGLE CURRENT EPISODE OF MAJOR DEPRESSIVE DISORDER (HCC): ICD-10-CM

## 2018-07-16 RX ORDER — ESCITALOPRAM OXALATE 10 MG/1
10 TABLET ORAL DAILY
Qty: 90 TABLET | Refills: 0 | Status: SHIPPED | OUTPATIENT
Start: 2018-07-16

## 2018-07-16 RX ORDER — BUPROPION HYDROCHLORIDE 150 MG/1
150 TABLET ORAL EVERY MORNING
Qty: 90 TABLET | Refills: 0 | Status: SHIPPED | OUTPATIENT
Start: 2018-07-16

## 2018-07-16 NOTE — TELEPHONE ENCOUNTER
From: Andrew Allen  Sent: 7/15/2018 12:31 PM EDT  Subject: Medication Renewal Request    Andrew Allen would like a refill of the following medications:     buPROPion (WELLBUTRIN XL) 150 MG extended release tablet Federica Fernandes MD]     escitalopram (LEXAPRO) 10 MG tablet Federica Fernandes MD]    Preferred pharmacy: [facility] [address] [place] [address] [phone]